# Patient Record
(demographics unavailable — no encounter records)

---

## 2025-05-28 NOTE — ASSESSMENT
[FreeTextEntry1] : (March 2020). Seems to be a healthy 24-year-old woman 7 months postpartum with some degree of anxiety.  Episodes do not sound like vertigo or vasovagal.  They do not sound arrhythmic as well.  I spoke with the paramedic who said her EKG was sinus rhythm around 100-104 with some irregularity but with P waves i.e. probably just sinus arrhythmia or APCs.  Definitely no atrial fibrillation.  In addition she had chest pain and tightness here while the EKG was being done and it is a totally normal EKG.  Long discussion with the patient and her .  For confirmation and reassurance she will come back for an echocardiogram to rule out any kind of postpartum cardiomyopathy although 7 months later would be unusual.  In addition she will have an event recorder and try to catch 1 of these prolonged episodes so we can be sure to rule out arrhythmia as an underlying cause.  We did discuss beta-blocker as a "as needed" or intermittent therapy as she did ask if there is any antianxiety medicines for this.  I mentioned it was an option but I would not recommended first-line now.  Labs will be sent including thyroid function.   Patient returns 5 years later May 28, 2025.  Together with her hide.  She has actually been doing very well and exercises lately no real symptoms per se.  No interval hospitalizations.  Still on Zoloft 100 mg since her postpartum depression and would like to calm down and on her own she did cut it down to 50.  Her internist there is concerned because her diastolic level gets below 80.  She has been monitoring it and on her own and does stay between 80 and 84.  Systolic pressure is fine.  Exam is fine and EKG is fine.  Long discussion with patient and  about the recommendations and prehypertension etc.  At this point in time it certainly reasonable to work on lifestyle modification and dietary conditions; she does not really drink alcohol and she is not that careful however about salt so we could try getting strict on salt and see if that brings the blood pressure down.  I am not yet prepared to put a 29-year-old premenopausal woman on blood pressure medication just because her diastolic is between 80 and 84.

## 2025-05-28 NOTE — REVIEW OF SYSTEMS
[Chest Discomfort] : chest discomfort [Palpitations] : palpitations [Syncope] : syncope [Blood in Stool] : blood in stool [Anxiety] : anxiety [Under Stress] : under stress [Negative] : Heme/Lymph [Fever] : no fever [Headache] : no headache [Weight Gain (___ Lbs)] : no recent weight gain [Chills] : no chills [Feeling Fatigued] : not feeling fatigued [Weight Loss (___ Lbs)] : no recent weight loss [SOB] : no shortness of breath [Dyspnea on exertion] : not dyspnea during exertion [Lower Ext Edema] : no extremity edema [Leg Claudication] : no intermittent leg claudication [Dizziness] : no dizziness [Tremor] : no tremor was seen [Numbness (Hypoesthesia)] : no numbness [Convulsions] : no convulsions [Tingling (Paresthesia)] : no tingling [Depression] : no depression

## 2025-05-28 NOTE — PHYSICAL EXAM
[General Appearance - Well Developed] : well developed [Normal Appearance] : normal appearance [Well Groomed] : well groomed [General Appearance - Well Nourished] : well nourished [No Deformities] : no deformities [General Appearance - In No Acute Distress] : no acute distress [Normal Conjunctiva] : the conjunctiva exhibited no abnormalities [Eyelids - No Xanthelasma] : the eyelids demonstrated no xanthelasmas [Normal Oral Mucosa] : normal oral mucosa [No Oral Pallor] : no oral pallor [No Oral Cyanosis] : no oral cyanosis [Normal Jugular Venous A Waves Present] : normal jugular venous A waves present [Normal Jugular Venous V Waves Present] : normal jugular venous V waves present [No Jugular Venous Berrios A Waves] : no jugular venous berrios A waves [Heart Rate And Rhythm] : heart rate and rhythm were normal [Heart Sounds] : normal S1 and S2 [Murmurs] : no murmurs present [Respiration, Rhythm And Depth] : normal respiratory rhythm and effort [Exaggerated Use Of Accessory Muscles For Inspiration] : no accessory muscle use [Auscultation Breath Sounds / Voice Sounds] : lungs were clear to auscultation bilaterally [Abdomen Soft] : soft [Abdomen Tenderness] : non-tender [Abdomen Mass (___ Cm)] : no abdominal mass palpated [Abnormal Walk] : normal gait [Gait - Sufficient For Exercise Testing] : the gait was sufficient for exercise testing [Nail Clubbing] : no clubbing of the fingernails [Cyanosis, Localized] : no localized cyanosis [Petechial Hemorrhages (___cm)] : no petechial hemorrhages [Skin Color & Pigmentation] : normal skin color and pigmentation [] : no rash [No Venous Stasis] : no venous stasis [Skin Lesions] : no skin lesions [No Skin Ulcers] : no skin ulcer [No Xanthoma] : no  xanthoma was observed [Oriented To Time, Place, And Person] : oriented to person, place, and time [Affect] : the affect was normal [Mood] : the mood was normal [FreeTextEntry1] : No edema.  Pulses 2+ bilaterally symmetric including pedal

## 2025-05-28 NOTE — HISTORY OF PRESENT ILLNESS
[FreeTextEntry1] : March 6, 2020.  The patient is active healthy young woman 7 months postpartum for her second daughter.  No chronic diseases.  A number of years ago when she was postpartum for her first daughter, she had episodes of dizziness and palpitations and went to a cardiologist and had a full work-up and all was negative and she was told it was just stress and anxiety.  Also once in [place] she had episodes of dizziness and had a neurologic work-up and was also told that everything was normal and it was stress and anxiety.  She is 1 of 11 siblings and there is a history of mild anxiety in the entire family.  Her father does have some kind of arrhythmia along with anxiety and had a procedure (?  Ablation) for an on and off irregular heartbeat.  No mention of atrial fibrillation for sure.  He has no other heart disease.  Her mother is alive and well other than thyroid disease.  Earlier this week on Sunday night 3/1 she felt dizzy for about 45 minutes.  Just felt as if there is a little movement in the room but no true vertigo and it persists constant for about 45 minutes and then goes away.  She was fine Monday but then had the same symptoms Tuesday afternoon Wednesday night, and then last night with the symptoms somebody came to check a blood pressure and it was 140/80.  When they came back later a recheck of blood pressure was 127/78 but they took an EKG and told her her heart rate was irregular.  Nobody mentioned atrial fibrillation to her.  They also said her blood sugar was a little low.  She has had in the past a couple of fainting episodes that were vasovagal; one when she stubbed her toe and one when she had a wisdom tooth removed and that is a totally different sensation.  She has no shortness of breath dyspnea on exertion etc.  When she is stressed she does get some atypical chest pain.  She also has mild asthma that is sometimes exercise related.  She is on no medications other than using the new living for the last 3 months.  Her ECG here sinus rhythm at 78 and is a normal tracing. March 13, 2020.  Labs within normal limits including lipids, thyroid, hemoglobin A1c. Echocardiogram today totally within normal limits. Patient has not had any symptoms since her visit. The event recorder came today so they are keeping it on standby.  May 28, 2025.  Patient returns 5 years later.  She has been very healthy.  She has been on 100 mg of Zoloft ever since postpartum depression and is now trying to get off and has cut back to 50 mg.  Her internist is concerned because her diastolic blood pressure never gets below 80 usually runs between 80 and 85.  Systolics seem to be in the 120-110 range.  He did recent labs on her which were within normal limits including chemistries and thyroid function but he did not do lipids and he did not do a CBC for some reason.  She is not overweight although she is upset that she cannot lose weight.  A grandfather on her mother side just developed atrial fibrillation and an older age as well.  No interim hospitalizations or procedures and she is here with her  and they are in good spirits.

## 2025-05-28 NOTE — DISCUSSION/SUMMARY
[FreeTextEntry1] : Reviewed her labs from elsewhere.  Reassured patient and her  for now.  Will try getting strict about salt intake and will continue to watch weight which seems to be okay.  No medications as yet.  Labs were sent for lipids and CBC which had not been done as mentioned above.  Tentative follow-up with me 1 year unless things change.  Her EKG showed sinus rhythm at 68 and there is increased voltage in lead I but at this age it is a normal variant.  Her echo 5 years ago did not have LVH and was an unremarkable study [EKG obtained to assist in diagnosis and management of assessed problem(s)] : EKG obtained to assist in diagnosis and management of assessed problem(s)